# Patient Record
Sex: FEMALE | Race: WHITE | Employment: OTHER | URBAN - METROPOLITAN AREA
[De-identification: names, ages, dates, MRNs, and addresses within clinical notes are randomized per-mention and may not be internally consistent; named-entity substitution may affect disease eponyms.]

---

## 2018-01-22 ENCOUNTER — HOSPITAL ENCOUNTER (OUTPATIENT)
Dept: WOMENS IMAGING | Age: 63
Discharge: HOME | End: 2018-01-22
Attending: OBSTETRICS & GYNECOLOGY | Admitting: OBSTETRICS & GYNECOLOGY

## 2018-01-22 DIAGNOSIS — Z12.31 SCREENING MAMMOGRAM, ENCOUNTER FOR: ICD-10-CM

## 2019-08-23 ENCOUNTER — HOSPITAL ENCOUNTER (OUTPATIENT)
Dept: WOMENS IMAGING | Age: 64
Discharge: HOME | End: 2019-08-23
Payer: COMMERCIAL

## 2019-08-23 DIAGNOSIS — Z12.31 SCREENING MAMMOGRAM, ENCOUNTER FOR: ICD-10-CM

## 2019-08-23 PROCEDURE — 77067 SCR MAMMO BI INCL CAD: CPT

## 2020-08-31 ENCOUNTER — HOSPITAL ENCOUNTER (OUTPATIENT)
Dept: WOMENS IMAGING | Age: 65
Discharge: HOME | End: 2020-08-31
Payer: COMMERCIAL

## 2020-08-31 PROCEDURE — 77080 DXA BONE DENSITY AXIAL: CPT

## 2020-08-31 PROCEDURE — 77067 SCR MAMMO BI INCL CAD: CPT

## 2020-08-31 PROCEDURE — 77063 BREAST TOMOSYNTHESIS BI: CPT

## 2021-09-07 ENCOUNTER — HOSPITAL ENCOUNTER (OUTPATIENT)
Dept: WOMENS IMAGING | Age: 66
Discharge: HOME | End: 2021-09-07
Payer: COMMERCIAL

## 2021-09-07 DIAGNOSIS — Z12.31 ENCOUNTER FOR SCREENING MAMMOGRAM FOR MALIGNANT NEOPLASM OF BREAST: ICD-10-CM

## 2021-09-07 PROCEDURE — 77063 BREAST TOMOSYNTHESIS BI: CPT

## 2022-09-07 ENCOUNTER — HOSPITAL ENCOUNTER (OUTPATIENT)
Dept: WOMENS IMAGING | Age: 67
Discharge: HOME | End: 2022-09-07
Payer: COMMERCIAL

## 2022-09-07 DIAGNOSIS — Z78.0 POSTMENOPAUSAL STATUS: ICD-10-CM

## 2022-09-07 DIAGNOSIS — Z12.31 ENCOUNTER FOR SCREENING MAMMOGRAM FOR MALIGNANT NEOPLASM OF BREAST: ICD-10-CM

## 2022-09-07 PROCEDURE — 77063 BREAST TOMOSYNTHESIS BI: CPT

## 2022-09-07 PROCEDURE — 77080 DXA BONE DENSITY AXIAL: CPT

## 2023-09-13 ENCOUNTER — HOSPITAL ENCOUNTER (OUTPATIENT)
Dept: WOMENS IMAGING | Age: 68
Discharge: HOME | End: 2023-09-13
Payer: COMMERCIAL

## 2023-09-13 VITALS — BODY MASS INDEX: 20.83 KG/M2 | WEIGHT: 125 LBS | HEIGHT: 65 IN

## 2023-09-13 DIAGNOSIS — Z12.31 ENCOUNTER FOR SCREENING MAMMOGRAM FOR MALIGNANT NEOPLASM OF BREAST: ICD-10-CM

## 2023-09-13 PROCEDURE — 77063 BREAST TOMOSYNTHESIS BI: CPT

## 2024-03-22 ENCOUNTER — HOSPITAL ENCOUNTER (INPATIENT)
Age: 69
LOS: 1 days | Discharge: HOME | DRG: 446 | End: 2024-03-23
Attending: STUDENT IN AN ORGANIZED HEALTH CARE EDUCATION/TRAINING PROGRAM | Admitting: STUDENT IN AN ORGANIZED HEALTH CARE EDUCATION/TRAINING PROGRAM
Payer: COMMERCIAL

## 2024-03-22 PROBLEM — R10.9 ABDOMINAL PAIN: Status: ACTIVE | Noted: 2024-03-22

## 2024-03-22 PROCEDURE — 6370000000 HC RX 637 (ALT 250 FOR IP): Performed by: STUDENT IN AN ORGANIZED HEALTH CARE EDUCATION/TRAINING PROGRAM

## 2024-03-22 PROCEDURE — G0379 DIRECT REFER HOSPITAL OBSERV: HCPCS

## 2024-03-22 PROCEDURE — 2580000003 HC RX 258: Performed by: STUDENT IN AN ORGANIZED HEALTH CARE EDUCATION/TRAINING PROGRAM

## 2024-03-22 PROCEDURE — 1200000000 HC SEMI PRIVATE

## 2024-03-22 PROCEDURE — G0378 HOSPITAL OBSERVATION PER HR: HCPCS

## 2024-03-22 RX ORDER — LANOLIN ALCOHOL/MO/W.PET/CERES
3 CREAM (GRAM) TOPICAL NIGHTLY PRN
Status: DISCONTINUED | OUTPATIENT
Start: 2024-03-22 | End: 2024-03-23 | Stop reason: HOSPADM

## 2024-03-22 RX ORDER — MAGNESIUM SULFATE IN WATER 40 MG/ML
2000 INJECTION, SOLUTION INTRAVENOUS PRN
Status: DISCONTINUED | OUTPATIENT
Start: 2024-03-22 | End: 2024-03-23 | Stop reason: HOSPADM

## 2024-03-22 RX ORDER — ACETAMINOPHEN 325 MG/1
650 TABLET ORAL EVERY 6 HOURS PRN
Status: DISCONTINUED | OUTPATIENT
Start: 2024-03-22 | End: 2024-03-23 | Stop reason: HOSPADM

## 2024-03-22 RX ORDER — POTASSIUM CHLORIDE 7.45 MG/ML
10 INJECTION INTRAVENOUS PRN
Status: DISCONTINUED | OUTPATIENT
Start: 2024-03-22 | End: 2024-03-23 | Stop reason: HOSPADM

## 2024-03-22 RX ORDER — SODIUM CHLORIDE 0.9 % (FLUSH) 0.9 %
5-40 SYRINGE (ML) INJECTION PRN
Status: DISCONTINUED | OUTPATIENT
Start: 2024-03-22 | End: 2024-03-23 | Stop reason: HOSPADM

## 2024-03-22 RX ORDER — POTASSIUM CHLORIDE 20 MEQ/1
20 TABLET, EXTENDED RELEASE ORAL ONCE
Status: COMPLETED | OUTPATIENT
Start: 2024-03-22 | End: 2024-03-22

## 2024-03-22 RX ORDER — POLYETHYLENE GLYCOL 3350 17 G/17G
17 POWDER, FOR SOLUTION ORAL DAILY PRN
Status: DISCONTINUED | OUTPATIENT
Start: 2024-03-22 | End: 2024-03-23 | Stop reason: HOSPADM

## 2024-03-22 RX ORDER — SODIUM CHLORIDE 0.9 % (FLUSH) 0.9 %
5-40 SYRINGE (ML) INJECTION EVERY 12 HOURS SCHEDULED
Status: DISCONTINUED | OUTPATIENT
Start: 2024-03-22 | End: 2024-03-23 | Stop reason: HOSPADM

## 2024-03-22 RX ORDER — ONDANSETRON 4 MG/1
4 TABLET, ORALLY DISINTEGRATING ORAL EVERY 8 HOURS PRN
Status: DISCONTINUED | OUTPATIENT
Start: 2024-03-22 | End: 2024-03-23 | Stop reason: HOSPADM

## 2024-03-22 RX ORDER — ONDANSETRON 2 MG/ML
4 INJECTION INTRAMUSCULAR; INTRAVENOUS EVERY 6 HOURS PRN
Status: DISCONTINUED | OUTPATIENT
Start: 2024-03-22 | End: 2024-03-23 | Stop reason: HOSPADM

## 2024-03-22 RX ORDER — SODIUM CHLORIDE 9 MG/ML
INJECTION, SOLUTION INTRAVENOUS PRN
Status: DISCONTINUED | OUTPATIENT
Start: 2024-03-22 | End: 2024-03-23 | Stop reason: HOSPADM

## 2024-03-22 RX ORDER — ENOXAPARIN SODIUM 100 MG/ML
40 INJECTION SUBCUTANEOUS EVERY EVENING
Status: DISCONTINUED | OUTPATIENT
Start: 2024-03-23 | End: 2024-03-23 | Stop reason: HOSPADM

## 2024-03-22 RX ORDER — SODIUM CHLORIDE, SODIUM LACTATE, POTASSIUM CHLORIDE, CALCIUM CHLORIDE 600; 310; 30; 20 MG/100ML; MG/100ML; MG/100ML; MG/100ML
INJECTION, SOLUTION INTRAVENOUS CONTINUOUS
Status: ACTIVE | OUTPATIENT
Start: 2024-03-22 | End: 2024-03-23

## 2024-03-22 RX ORDER — MORPHINE SULFATE 2 MG/ML
2 INJECTION, SOLUTION INTRAMUSCULAR; INTRAVENOUS EVERY 4 HOURS PRN
Status: DISCONTINUED | OUTPATIENT
Start: 2024-03-22 | End: 2024-03-23 | Stop reason: HOSPADM

## 2024-03-22 RX ORDER — ACETAMINOPHEN 650 MG/1
650 SUPPOSITORY RECTAL EVERY 6 HOURS PRN
Status: DISCONTINUED | OUTPATIENT
Start: 2024-03-22 | End: 2024-03-23 | Stop reason: HOSPADM

## 2024-03-22 RX ADMIN — SODIUM CHLORIDE, POTASSIUM CHLORIDE, SODIUM LACTATE AND CALCIUM CHLORIDE: 600; 310; 30; 20 INJECTION, SOLUTION INTRAVENOUS at 23:38

## 2024-03-22 RX ADMIN — POTASSIUM CHLORIDE 20 MEQ: 1500 TABLET, EXTENDED RELEASE ORAL at 23:15

## 2024-03-22 RX ADMIN — SODIUM CHLORIDE, PRESERVATIVE FREE 10 ML: 5 INJECTION INTRAVENOUS at 23:38

## 2024-03-22 ASSESSMENT — PAIN SCALES - GENERAL
PAINLEVEL_OUTOF10: 1
PAINLEVEL_OUTOF10: 1

## 2024-03-23 ENCOUNTER — APPOINTMENT (OUTPATIENT)
Dept: MRI IMAGING | Age: 69
DRG: 446 | End: 2024-03-23
Attending: STUDENT IN AN ORGANIZED HEALTH CARE EDUCATION/TRAINING PROGRAM
Payer: COMMERCIAL

## 2024-03-23 VITALS
RESPIRATION RATE: 16 BRPM | DIASTOLIC BLOOD PRESSURE: 73 MMHG | TEMPERATURE: 97.7 F | HEART RATE: 66 BPM | WEIGHT: 116.84 LBS | SYSTOLIC BLOOD PRESSURE: 125 MMHG | BODY MASS INDEX: 19.47 KG/M2 | OXYGEN SATURATION: 98 % | HEIGHT: 65 IN

## 2024-03-23 LAB
ALBUMIN SERPL-MCNC: 3.9 GM/DL (ref 3.4–5)
ALP BLD-CCNC: 108 IU/L (ref 40–128)
ALT SERPL-CCNC: 8 U/L (ref 10–40)
ANION GAP SERPL CALCULATED.3IONS-SCNC: 9 MMOL/L (ref 7–16)
AST SERPL-CCNC: 15 IU/L (ref 15–37)
BASOPHILS ABSOLUTE: 0.1 K/CU MM
BASOPHILS RELATIVE PERCENT: 1.3 % (ref 0–1)
BILIRUB SERPL-MCNC: 0.7 MG/DL (ref 0–1)
BUN SERPL-MCNC: 10 MG/DL (ref 6–23)
CALCIUM SERPL-MCNC: 9.4 MG/DL (ref 8.3–10.6)
CHLORIDE BLD-SCNC: 102 MMOL/L (ref 99–110)
CO2: 28 MMOL/L (ref 21–32)
CREAT SERPL-MCNC: 0.6 MG/DL (ref 0.6–1.1)
DIFFERENTIAL TYPE: ABNORMAL
EOSINOPHILS ABSOLUTE: 0.3 K/CU MM
EOSINOPHILS RELATIVE PERCENT: 4.2 % (ref 0–3)
ESTIMATED AVERAGE GLUCOSE: 111 MG/DL
GFR SERPL CREATININE-BSD FRML MDRD: >60 ML/MIN/1.73M2
GLUCOSE SERPL-MCNC: 92 MG/DL (ref 70–99)
HBA1C MFR BLD: 5.5 % (ref 4.2–6.3)
HCT VFR BLD CALC: 36.1 % (ref 37–47)
HEMOGLOBIN: 12.1 GM/DL (ref 12.5–16)
IMMATURE NEUTROPHIL %: 0.1 % (ref 0–0.43)
INR BLD: 1 INDEX
LYMPHOCYTES ABSOLUTE: 2.1 K/CU MM
LYMPHOCYTES RELATIVE PERCENT: 28 % (ref 24–44)
MCH RBC QN AUTO: 29.7 PG (ref 27–31)
MCHC RBC AUTO-ENTMCNC: 33.5 % (ref 32–36)
MCV RBC AUTO: 88.7 FL (ref 78–100)
MONOCYTES ABSOLUTE: 0.7 K/CU MM
MONOCYTES RELATIVE PERCENT: 9.6 % (ref 0–4)
NUCLEATED RBC %: 0 %
PDW BLD-RTO: 13.2 % (ref 11.7–14.9)
PLATELET # BLD: 211 K/CU MM (ref 140–440)
PMV BLD AUTO: 9.7 FL (ref 7.5–11.1)
POTASSIUM SERPL-SCNC: 4 MMOL/L (ref 3.5–5.1)
PROTHROMBIN TIME: 13.7 SECONDS (ref 11.7–14.5)
RBC # BLD: 4.07 M/CU MM (ref 4.2–5.4)
SEGMENTED NEUTROPHILS ABSOLUTE COUNT: 4.3 K/CU MM
SEGMENTED NEUTROPHILS RELATIVE PERCENT: 56.8 % (ref 36–66)
SODIUM BLD-SCNC: 139 MMOL/L (ref 135–145)
TOTAL IMMATURE NEUTOROPHIL: 0.01 K/CU MM
TOTAL NUCLEATED RBC: 0 K/CU MM
TOTAL PROTEIN: 6 GM/DL (ref 6.4–8.2)
WBC # BLD: 7.6 K/CU MM (ref 4–10.5)

## 2024-03-23 PROCEDURE — 85025 COMPLETE CBC W/AUTO DIFF WBC: CPT

## 2024-03-23 PROCEDURE — 36415 COLL VENOUS BLD VENIPUNCTURE: CPT

## 2024-03-23 PROCEDURE — 94761 N-INVAS EAR/PLS OXIMETRY MLT: CPT

## 2024-03-23 PROCEDURE — 6370000000 HC RX 637 (ALT 250 FOR IP): Performed by: STUDENT IN AN ORGANIZED HEALTH CARE EDUCATION/TRAINING PROGRAM

## 2024-03-23 PROCEDURE — 99222 1ST HOSP IP/OBS MODERATE 55: CPT | Performed by: SURGERY

## 2024-03-23 PROCEDURE — 85610 PROTHROMBIN TIME: CPT

## 2024-03-23 PROCEDURE — 80053 COMPREHEN METABOLIC PANEL: CPT

## 2024-03-23 PROCEDURE — G0378 HOSPITAL OBSERVATION PER HR: HCPCS

## 2024-03-23 PROCEDURE — 83036 HEMOGLOBIN GLYCOSYLATED A1C: CPT

## 2024-03-23 RX ADMIN — ACETAMINOPHEN 650 MG: 325 TABLET ORAL at 06:46

## 2024-03-23 ASSESSMENT — PAIN - FUNCTIONAL ASSESSMENT: PAIN_FUNCTIONAL_ASSESSMENT: ACTIVITIES ARE NOT PREVENTED

## 2024-03-23 ASSESSMENT — PAIN DESCRIPTION - DESCRIPTORS: DESCRIPTORS: ACHING

## 2024-03-23 ASSESSMENT — PAIN DESCRIPTION - LOCATION: LOCATION: HEAD

## 2024-03-23 NOTE — DISCHARGE INSTRUCTIONS
Patient Discharge Instructions  Dr. Corby Roman  [address] [place] [address]    [address] [facility], [address]  [place] [address]    [phone]         SPECIAL INSTRUCTIONS:       If follow up appointment was not given to you, call the Surgical Clinic at [phone] for follow up appointment with Dr. Roman in:  1 month.          Return if symptoms worsen

## 2024-03-23 NOTE — CONSULTS
Department of General Surgery   Surgical Service Dr. Roman   Consult Note    Date of Consult: 3/23/24    Reason for Consult:  RUQ pain, nausea  Requesting Physician:  Dr. Horowitz    CHIEF COMPLAINT:  RUQ pain    History Obtained From:  patient    HISTORY OF PRESENT ILLNESS:    The patient is a 69 y.o. female who presented with RUQ pain associated with nausea. Symptoms started yesterday and she was seen at [facility].  US showed distended gallbladder with borderline CBD dilation at 7.7mm.  She her nausea and pain have improved. She reports mild headache from not drinking coffee.  Currently she denies other complaints.  Workup is significant for normal LFTs, no leukocytosis. No stones were seen on US.      Past Medical History:    No past medical history on file.    Past Surgical History:    Past Surgical History:   Procedure Laterality Date    NECK SURGERY      reports nodules removed       Current Medications:   Current Facility-Administered Medications   Medication Dose Route Frequency Provider Last Rate Last Admin    sodium chloride flush 0.9 % injection 5-40 mL  5-40 mL IntraVENous 2 times per day Dino Prado MD   10 mL at 03/22/24 2338    sodium chloride flush 0.9 % injection 5-40 mL  5-40 mL IntraVENous PRN Dino Prado MD        0.9 % sodium chloride infusion   IntraVENous PRN Dino Prado MD        potassium chloride 10 mEq/100 mL IVPB (Peripheral Line)  10 mEq IntraVENous PRN Dino Prado MD        magnesium sulfate 2000 mg in 50 mL IVPB premix  2,000 mg IntraVENous PRN Dino Prado MD        enoxaparin (LOVENOX) injection 40 mg  40 mg SubCUTAneous QPM Dino Prado MD        ondansetron (ZOFRAN-ODT) disintegrating tablet 4 mg  4 mg Oral Q8H PRN Dino Prado MD        Or    ondansetron (ZOFRAN) injection 4 mg  4 mg IntraVENous Q6H PRN Dino Prado MD        melatonin tablet 3 mg  3 mg Oral Nightly PRN Dino Prado MD        polyethylene glycol   53 kg (116 lb 13.5 oz)    Height:  1.651 m (5' 5\")        Physical Exam  General: awake, alert, in no acute distress  HEENT: mucous membranes moist  Respiratory: normal effort, no wheezes appreciated  CV: appears well perfused, regular rate and rhythm  Abdomen: Soft, non-tender, non-distended. No guarding or rebound tenderness.  Skin: warm and dry  Extremities: atraumatic  Neuro: no focal deficits noted  Psych: mood normal        DATA:    Lab Results   Component Value Date    WBC 7.6 03/23/2024    HGB 12.1 (L) 03/23/2024    HCT 36.1 (L) 03/23/2024    MCV 88.7 03/23/2024     03/23/2024     Lab Results   Component Value Date/Time     03/23/2024 02:39 AM    K 4.0 03/23/2024 02:39 AM     03/23/2024 02:39 AM    CO2 28 03/23/2024 02:39 AM    BUN 10 03/23/2024 02:39 AM    CREATININE 0.6 03/23/2024 02:39 AM    GLUCOSE 92 03/23/2024 02:39 AM    CALCIUM 9.4 03/23/2024 02:39 AM      [facility]:3/22/24  IMPRESSION:   1. Dilation of the common bile duct to 7.7 mm. This can be seen in common bile   duct stone or stricture. Recommend correlation with laboratory evaluation.   Further evaluation is necessary, recommend MRCP the abdomen without contrast.   2. Moderate gallbladder distention. No gallstones or ultrasound evidence for   acute cholecystitis.   3. Multiple cysts throughout the liver.     IMPRESSION:    69 y.o. female with RUQ pain and nausea. Symptoms have improved.  Symptoms are classical for gallbladder etiology. With normal LFTs and only borderline CBD dilation, I do not feel that MRCP is completely necessary.  I offered cholecystectomy vs observation given that her symptoms have improved.  She prefers observation at this time.    Patient Active Problem List:     Abdominal pain        PLAN:  - ok with discharge from a surgical standpoint  - will provide patient with my office information, she should follow up if symptoms recur        Electronically signed by Corby Roman MD on

## 2024-03-23 NOTE — PLAN OF CARE
Problem: Discharge Planning  Goal: Discharge to home or other facility with appropriate resources  3/22/2024 2302 by Shay Santos RN  Outcome: Progressing  Flowsheets (Taken 3/22/2024 2300)  Discharge to home or other facility with appropriate resources: Refer to discharge planning if patient needs post-hospital services based on physician order or complex needs related to functional status, cognitive ability or social support system  3/22/2024 2241 by Shay Santos RN  Outcome: Progressing  3/22/2024 2240 by Shay Santos RN  Outcome: Progressing     Problem: ABCDS Injury Assessment  Goal: Absence of physical injury  3/22/2024 2302 by Shay Santos RN  Outcome: Progressing  3/22/2024 2241 by Shay Santos RN  Outcome: Progressing  3/22/2024 2240 by Shay Santos RN  Outcome: Progressing     Problem: Pain  Goal: Verbalizes/displays adequate comfort level or baseline comfort level  Outcome: Progressing

## 2024-03-23 NOTE — H&P
History and Physical      Name:  Alan Singh /Age/Sex: 1955  (69 y.o. female)   MRN & CSN:  [id number] & [id number] Encounter Date/Time: 3/22/2024 9:25 PM   Location:  1105/1105-A PCP: Julissa Mitchell,        Hospital Day: 1    Assessment and Plan:     Patient is a 69-year-old female who presented with abdominal pain. Transferred from [facility] ED.     # Dilated common bile duct  - Endorsed upper abdominal pain with nausea x1 day. No fevers or previous episodes. No previous surgeries. Last BM am 3/22. No alcohol intake.   - At OSH ED, RUQ US showed CBD dilatation of 7.7 mm without stones or strictures. Labs including lipase unremarkable. Elias's negative.   - MRCP ordered, follow-up. NPO for now pending further work-up. Supportive care.    # Hyperglycemia  - Mild.  - Follow-up A1c.    Checklist:  Advanced care planning: full  Diet: NPO for now  DVT ppx: Lovenox    Disposition: admit to inpatient.  Estimated discharge: 2-3 day(s).  Current living situation: home.  Expected disposition: home.    Spoke with ED provider who recommended admission for the patient and I agree with that plan.  Personally reviewed lab studies and imaging.  Imaging that was interpreted personally and results as stated above.    History of Present Illness:     Chief Complaint: abdominal pain    Patient is a 69-year-old female with no known PMHx who presented to the ED with upper abdominal pain with nausea x1 day. No fevers or previous episodes. No previous surgeries. Last BM am 3/22. No alcohol intake. Denied any CP, SOB, cough, emesis or urinary changes. Denied any tobacco or alcohol use.    History obtained from: patient and ED provider.    ROS:     Pertinent positives and negatives discussed in HPI above.    Objective:     No intake or output data in the 24 hours ending 24     Vitals: There were no vitals filed for this visit.  BMI: There is no height or weight on file to calculate BMI.  General:  \"BLOODU\", \"GLUCOSEU\", \"KETUA\", \"AMORPHOUS\"  Urine Cultures: No results found for: \"LABURIN\"  Blood Cultures: No results found for: \"BC\"  No results found for: \"BLOODCULT2\"  Organism: No results found for: \"ORG\"    Radiology results:  No orders to display       Dino Prado MD  03/22/24 9:25 PM

## 2024-03-23 NOTE — DISCHARGE SUMMARY
Discharge Summary    Name:  Alan Singh /Age/Sex: 1955  (69 y.o. female)   MRN & CSN:  [id number] & [id number] Admission Date/Time: 3/22/2024  9:21 PM   Attending:  Chucky Horowitz MD Discharging Physician: CARY Pope - [facility] Course:   Alan Signh is a 69 y.o.  female  who presents with Abdominal pain      HPI Per H&P on admission 3/22/2024 \"Patient is a 69-year-old female with no known PMHx who presented to the ED with upper abdominal pain with nausea x1 day. No fevers or previous episodes. No previous surgeries. Last BM am 3/22. No alcohol intake. Denied any CP, SOB, cough, emesis or urinary changes. Denied any tobacco or alcohol use. \"    The patient was placed in observation status, POC as noted below    Abdominal pain, dilated common bile duct-patient reports epigastric pain after eating with nausea vomiting yesterday . US the abdomen showed dilation of the common bile duct to 7.7 mm.  Recommending MRCP without contrast, moderate gallbladder distention without evidence for acute cholecystitis.  LFTs unremarkable.  This can be seen in common bile duct stone or stricture.  She was intravenously hydrated pain and nausea managed.  MRCP had been ordered overnight General surgery consulted and offered patient cholecystectomy versus observation given that her symptoms had improved.  The patient preferred observation. Per general surgery recs did not feel MRCP was completely necessary; therefore was canceled.  Okay to discharge from surgical standpoint with outpatient follow-up.  She had no further nausea vomiting or abdominal pain since admission.  Patient tolerating oral diet without nausea vomiting or return of abdominal pain.      Liver cyst-multiple liver cyst noted on ultrasound.  Outpatient follow-up  Hyperglycemia-A1c 5.5  Normocytic anemia-no signs of active bleeding, History of colonoscopy  status post polypectomy.  Outpatient follow-up    The patient expressed  multiple cysts identified throughout the liver, the largest of which measures 3.2 x 2.2 x 2.0 cm with a thin septation. There are other septated cyst as well. No intrahepatic bile duct dilatation. The common bile duct measures 7.7 mm. Moderate distention of the gallbladder. No stones, wall thickening or pericholecystic fluid. No sonographic Elias sign. Right kidney measures 10.6 x 3.9 x 4.9 cm. Cortical thickness 1.1 cm. Echogenicity of the right kidney appears normal without dilation of the right collecting system.    IMPRESSION: 1. Dilation of the common bile duct to 7.7 mm. This can be seen in common bile duct stone or stricture. Recommend correlation with laboratory evaluation. Further evaluation is necessary, recommend MRCP the abdomen without contrast. 2. Moderate gallbladder distention. No gallstones or ultrasound evidence for acute cholecystitis. 3. Multiple cysts throughout the liver. Report provided using department protocol at the time of dictation. Electronically Signed By: Marcello Kapoor MD on 3/22/2024 4:29 PM    .re  Discharge 29 minutes    Electronically signed by CARY Pope CNP on 3/23/2024 at 9:38 AM

## 2024-03-23 NOTE — PROGRESS NOTES
V2.0  [facility] Progress Note      Name:  Alan Singh /Age/Sex: 1955  (69 y.o. female)   MRN & CSN:  [id number] & [id number] Encounter Date/Time: 3/23/2024 8:58 AM EDT    [facility]  [facility] PCP: Julissa Mitchell DO       Hospital Day: 2    Assessment and Plan:   Alan Singh is a 69 y.o. female  who presents with Abdominal pain    Abdominal pain, dilated common bile duct-patient reports epigastric pain after eating with nausea vomiting yesterday . US the abdomen showed dilation of the common bile duct to 7.7 mm.  Recommending MRCP without contrast, moderate gallbladder distention without evidence for acute cholecystitis.  LFTs unremarkable.  This can be seen in common bile duct stone or stricture.   -N.p.o. except for ice, IV hydration, pain and antiemetic medications as needed, MRCP ordered and pending, will consult General surgery  Liver cyst-multiple liver cyst noted on ultrasound.  Outpatient follow-up  Hyperglycemia-A1c 5.5  Normocytic anemia-no signs of active bleeding, will check iron studies.  History of colonoscopy  status post polypectomy.    Diet Diet NPO Exceptions are: Ice Chips, Sips of Water with Meds, Sips of Clear Liquids   DVT Prophylaxis [x] Lovenox, []  Heparin, [] SCDs, [] Ambulation,  [] Eliquis, [] Xarelto  [] Coumadin   Code Status Full Code   Disposition From: code  Expected Disposition: home  Estimated Date of Discharge: home  Patient requires continued hospitalization due to further evaluation of abdominal pain and pending MRCP   Surrogate Decision Maker/ POA self     Subjective:     Chief Complaint: No chief complaint on file.       Alan Singh is a 69 y.o. female who presents with abdominal pain         Review of Systems:    Review of Systems    No acute events.  Patient denies chest pain or shortness of breath.  She denies fever or chills.  States her abdominal pain has resolved since receiving pain medicine.  Denies nausea at this  time    Objective:   No intake or output data in the 24 hours ending 03/23/24 0858     Vitals:   Vitals:    03/23/24 0300   BP: 99/63   Pulse: 56   Resp: 16   Temp: 98.3 °F (36.8 °C)   SpO2: 94%       Physical Exam:   General: NAD  Eyes: EOMI  ENT: neck supple  Cardiovascular: Regular rate.  Respiratory: Clear to auscultation  Gastrointestinal: Soft, non tender  Genitourinary: no suprapubic tenderness  Musculoskeletal: No edema  Skin: warm, dry  Neuro: Alert.  Psych: Mood appropriate.     Medications:   Medications:    sodium chloride flush  5-40 mL IntraVENous 2 times per day    enoxaparin  40 mg SubCUTAneous QPM      Infusions:    sodium chloride       PRN Meds: sodium chloride flush, 5-40 mL, PRN  sodium chloride, , PRN  potassium chloride, 10 mEq, PRN  magnesium sulfate, 2,000 mg, PRN  ondansetron, 4 mg, Q8H PRN   Or  ondansetron, 4 mg, Q6H PRN  melatonin, 3 mg, Nightly PRN  polyethylene glycol, 17 g, Daily PRN  acetaminophen, 650 mg, Q6H PRN   Or  acetaminophen, 650 mg, Q6H PRN  morphine, 2 mg, Q4H PRN        Labs      Recent Results (from the past 24 hour(s))   Comprehensive Metabolic Panel w/ Reflex to MG    Collection Time: 03/23/24  2:39 AM   Result Value Ref Range    Sodium 139 135 - 145 MMOL/L    Potassium 4.0 3.5 - 5.1 MMOL/L    Chloride 102 99 - 110 mMol/L    CO2 28 21 - 32 MMOL/L    Anion Gap 9 7 - 16    Glucose 92 70 - 99 MG/DL    BUN 10 6 - 23 MG/DL    Creatinine 0.6 0.6 - 1.1 MG/DL    Est, Glom Filt Rate >60 >60 mL/min/1.73m2    Calcium 9.4 8.3 - 10.6 MG/DL    Total Protein 6.0 (L) 6.4 - 8.2 GM/DL    Albumin 3.9 3.4 - 5.0 GM/DL    Total Bilirubin 0.7 0.0 - 1.0 MG/DL    Alkaline Phosphatase 108 40 - 128 IU/L    ALT 8 (L) 10 - 40 U/L    AST 15 15 - 37 IU/L   CBC with Auto Differential    Collection Time: 03/23/24  2:39 AM   Result Value Ref Range    WBC 7.6 4.0 - 10.5 K/CU MM    RBC 4.07 (L) 4.2 - 5.4 M/CU MM    Hemoglobin 12.1 (L) 12.5 - 16.0 GM/DL    Hematocrit 36.1 (L) 37 - 47 %    MCV 88.7 78 -

## 2024-03-23 NOTE — PROGRESS NOTES
4 Eyes Skin Assessment     NAME:  Alan Singh  YOB: 1955  MEDICAL RECORD NUMBER:  [id number]    The patient is being assessed for  Admission    I agree that at least one RN has performed a thorough Head to Toe Skin Assessment on the patient. ALL assessment sites listed below have been assessed.      Areas assessed by both nurses:    Head, Face, Ears, Shoulders, Back, Chest, Arms, Elbows, Hands, Sacrum. Buttock, Coccyx, Ischium, Legs. Feet and Heels, and Under Medical Devices         Does the Patient have a Wound? No noted wound(s)       Yuniel Prevention initiated by RN: Yes  Wound Care Orders initiated by RN: No    Pressure Injury (Stage 3,4, Unstageable, DTI, NWPT, and Complex wounds) if present, place Wound referral order by RN under : No    New Ostomies, if present place, Ostomy referral order under : No     Nurse 1 eSignature: Electronically signed by Shay Santos RN on 3/22/24 at 10:19 PM EDT    **SHARE this note so that the co-signing nurse can place an eSignature**    Nurse 2 eSignature: Electronically signed by Idania Ramos LPN on 3/22/24 at 11:26 PM EDT

## 2024-03-26 ENCOUNTER — HOSPITAL ENCOUNTER (EMERGENCY)
Age: 69
Discharge: HOME | End: 2024-03-26
Payer: COMMERCIAL

## 2024-03-26 ENCOUNTER — APPOINTMENT (OUTPATIENT)
Dept: ULTRASOUND IMAGING | Age: 69
End: 2024-03-26
Payer: COMMERCIAL

## 2024-03-26 VITALS
HEART RATE: 73 BPM | DIASTOLIC BLOOD PRESSURE: 82 MMHG | TEMPERATURE: 98.3 F | SYSTOLIC BLOOD PRESSURE: 135 MMHG | RESPIRATION RATE: 16 BRPM | OXYGEN SATURATION: 97 %

## 2024-03-26 DIAGNOSIS — R10.11 ABDOMINAL PAIN, RIGHT UPPER QUADRANT: Primary | ICD-10-CM

## 2024-03-26 DIAGNOSIS — K76.89 HEPATIC CYST: ICD-10-CM

## 2024-03-26 LAB
ALBUMIN SERPL-MCNC: 4.1 GM/DL (ref 3.4–5)
ALP BLD-CCNC: 122 IU/L (ref 40–129)
ALT SERPL-CCNC: <5 U/L (ref 10–40)
ANION GAP SERPL CALCULATED.3IONS-SCNC: 9 MMOL/L (ref 7–16)
AST SERPL-CCNC: 14 IU/L (ref 15–37)
BACTERIA: ABNORMAL /HPF
BASOPHILS ABSOLUTE: 0.1 K/CU MM
BASOPHILS RELATIVE PERCENT: 1.2 % (ref 0–1)
BILIRUB SERPL-MCNC: 0.9 MG/DL (ref 0–1)
BILIRUBIN URINE: NEGATIVE MG/DL
BLOOD, URINE: NEGATIVE
BUN SERPL-MCNC: 11 MG/DL (ref 6–23)
CALCIUM SERPL-MCNC: 10 MG/DL (ref 8.3–10.6)
CHLORIDE BLD-SCNC: 100 MMOL/L (ref 99–110)
CLARITY: CLEAR
CO2: 31 MMOL/L (ref 21–32)
COLOR: YELLOW
CREAT SERPL-MCNC: 0.6 MG/DL (ref 0.6–1.1)
DIFFERENTIAL TYPE: ABNORMAL
EOSINOPHILS ABSOLUTE: 0.3 K/CU MM
EOSINOPHILS RELATIVE PERCENT: 4.1 % (ref 0–3)
GFR SERPL CREATININE-BSD FRML MDRD: >90 ML/MIN/1.73M2
GLUCOSE SERPL-MCNC: 94 MG/DL (ref 70–99)
GLUCOSE, URINE: NEGATIVE MG/DL
HCT VFR BLD CALC: 42.8 % (ref 37–47)
HEMOGLOBIN: 13.5 GM/DL (ref 12.5–16)
IMMATURE NEUTROPHIL %: 0.3 % (ref 0–0.43)
KETONES, URINE: NEGATIVE MG/DL
LEUKOCYTE ESTERASE, URINE: ABNORMAL
LIPASE: 33 IU/L (ref 13–60)
LYMPHOCYTES ABSOLUTE: 1.6 K/CU MM
LYMPHOCYTES RELATIVE PERCENT: 22.7 % (ref 24–44)
MCH RBC QN AUTO: 28.7 PG (ref 27–31)
MCHC RBC AUTO-ENTMCNC: 31.5 % (ref 32–36)
MCV RBC AUTO: 90.9 FL (ref 78–100)
MONOCYTES ABSOLUTE: 0.9 K/CU MM
MONOCYTES RELATIVE PERCENT: 11.9 % (ref 0–4)
MUCUS: ABNORMAL HPF
NITRITE URINE, QUANTITATIVE: POSITIVE
NUCLEATED RBC %: 0 %
PDW BLD-RTO: 13.2 % (ref 11.7–14.9)
PH, URINE: 7 (ref 5–8)
PLATELET # BLD: 250 K/CU MM (ref 140–440)
PMV BLD AUTO: 9.8 FL (ref 7.5–11.1)
POTASSIUM SERPL-SCNC: 4.4 MMOL/L (ref 3.5–5.1)
PROTEIN UA: NEGATIVE MG/DL
RBC # BLD: 4.71 M/CU MM (ref 4.2–5.4)
RBC URINE: 2 /HPF (ref 0–6)
SEGMENTED NEUTROPHILS ABSOLUTE COUNT: 4.3 K/CU MM
SEGMENTED NEUTROPHILS RELATIVE PERCENT: 59.8 % (ref 36–66)
SODIUM BLD-SCNC: 140 MMOL/L (ref 135–145)
SPECIFIC GRAVITY UA: 1.01 (ref 1–1.03)
SQUAMOUS EPITHELIAL: 3 /HPF
TOTAL IMMATURE NEUTOROPHIL: 0.02 K/CU MM
TOTAL NUCLEATED RBC: 0 K/CU MM
TOTAL PROTEIN: 7.4 GM/DL (ref 6.4–8.2)
TRICHOMONAS: ABNORMAL /HPF
UROBILINOGEN, URINE: 0.2 MG/DL (ref 0.2–1)
WBC # BLD: 7.2 K/CU MM (ref 4–10.5)
WBC UA: 8 /HPF (ref 0–5)

## 2024-03-26 PROCEDURE — 83690 ASSAY OF LIPASE: CPT

## 2024-03-26 PROCEDURE — 81001 URINALYSIS AUTO W/SCOPE: CPT

## 2024-03-26 PROCEDURE — 85025 COMPLETE CBC W/AUTO DIFF WBC: CPT

## 2024-03-26 PROCEDURE — 87186 SC STD MICRODIL/AGAR DIL: CPT

## 2024-03-26 PROCEDURE — 87077 CULTURE AEROBIC IDENTIFY: CPT

## 2024-03-26 PROCEDURE — 99284 EMERGENCY DEPT VISIT MOD MDM: CPT

## 2024-03-26 PROCEDURE — 6370000000 HC RX 637 (ALT 250 FOR IP): Performed by: PHYSICIAN ASSISTANT

## 2024-03-26 PROCEDURE — 76705 ECHO EXAM OF ABDOMEN: CPT

## 2024-03-26 PROCEDURE — 80053 COMPREHEN METABOLIC PANEL: CPT

## 2024-03-26 PROCEDURE — 87086 URINE CULTURE/COLONY COUNT: CPT

## 2024-03-26 RX ORDER — ONDANSETRON 4 MG/1
4 TABLET, ORALLY DISINTEGRATING ORAL 3 TIMES DAILY PRN
Qty: 21 TABLET | Refills: 0 | Status: SHIPPED | OUTPATIENT
Start: 2024-03-26

## 2024-03-26 RX ORDER — SULFAMETHOXAZOLE AND TRIMETHOPRIM 800; 160 MG/1; MG/1
1 TABLET ORAL ONCE
Status: COMPLETED | OUTPATIENT
Start: 2024-03-26 | End: 2024-03-26

## 2024-03-26 RX ORDER — SULFAMETHOXAZOLE AND TRIMETHOPRIM 800; 160 MG/1; MG/1
1 TABLET ORAL 2 TIMES DAILY
Qty: 20 TABLET | Refills: 0 | Status: SHIPPED | OUTPATIENT
Start: 2024-03-26 | End: 2024-04-05

## 2024-03-26 RX ADMIN — SULFAMETHOXAZOLE AND TRIMETHOPRIM 1 TABLET: 800; 160 TABLET ORAL at 13:41

## 2024-03-26 NOTE — ED PROVIDER NOTES
31.5 (*)     Lymphocytes % 22.7 (*)     Monocytes % 11.9 (*)     Eosinophils % 4.1 (*)     Basophils % 1.2 (*)     All other components within normal limits   COMPREHENSIVE METABOLIC PANEL - Abnormal; Notable for the following components:    ALT <5 (*)     AST 14 (*)     All other components within normal limits   URINE MICROSCOPIC WITH REFLEX TO CULTURE - Abnormal; Notable for the following components:    WBC, UA 8 (*)     Bacteria, UA FEW (*)     Mucus, UA RARE (*)     All other components within normal limits   CULTURE, URINE   LIPASE       When ordered only abnormal lab results are displayed. All other labs were within normal range or not returned as of this dictation.    EKG: When ordered, EKG's are interpreted by the Emergency Department Physician in the absence of a cardiologist.  Please see their note for interpretation of EKG.    IMAGING:   Non-plain film images such as CT, Ultrasound and MRI are read by the radiologist. Plain radiographic images are visualized and preliminarily interpreted by the ED Provider with findings mentioned in ED Course and MDM    Interpretation per the Radiologist below, if available at the time of this note:    US ABDOMEN LIMITED Specify organ? GALLBLADDER   Final Result      Common bile duct is only mildly dilated for age, approximately 8 mm. No evidence   for choledocholithiasis, cholelithiasis or intrahepatic biliary duct dilatation.           US ABDOMEN LIMITED Specify organ? GALLBLADDER    Result Date: 3/26/2024  EXAM: ULTRASOUND RIGHT UPPER QUADRANT INDICATION: Right upper quadrant pain, possible dilated common bile duct on the outpatient ultrasound performed last week COMPARISON: 3/22/2024 FINDINGS: PANCREAS: Visualized in the head and neck; normal in visualized portions. LIVER: Anechoic cysts are present in the right hepatic lobe, largest measuring 3.2 x 2.5 x 2.6 cm. GALLBLADDER: The gallbladder wall thickness is normal. No evidence for gallbladder stone, polyp or

## 2024-03-26 NOTE — DISCHARGE INSTRUCTIONS
Contact Dr. Romna's office this week to discuss your repeat visit to the emergency department, as well as to schedule a sooner follow-up.  Please let them know that we discussed your workup with Dr. Roman as well as Dr. Marx who advises that you have a sooner appointment, as well as   possible HIDA scan, and/or MRCP.    Additionally follow-up with the gastroenterologist to discuss the results of your ultrasounds (liver cyst, mild common bile duct dilation)    Follow-up with your primary care provider for reevaluation of your symptoms and to discuss the results of your urine culture.  Meanwhile continue antibiotics until they are completed or advised by  That you can discontinue.    Return with any uncontrollable pain uncontrolled vomiting, fever, jaundice, confusion, worsening symptoms or any new concerns    Meanwhile to help with any abdominal pain or gallbladder issues, you can decrease the high-fat foods in your diet and eat smaller more frequent meals.  If needed use the prescription medications prescribed to you today.

## 2024-03-28 LAB
CULTURE: ABNORMAL
CULTURE: ABNORMAL
Lab: ABNORMAL
SPECIMEN: ABNORMAL

## 2024-03-29 NOTE — PROGRESS NOTES
Pharmacy Note  ED Culture Follow-up    Alan Singh is a 69 y.o. female.     Allergies: Clindamycin/lincomycin     Current antimicrobials:   Reviewed patient's urine culture - culture positive for morganella morganii >100,000 CFU/ml.  Patient was discharged on Bactrim -160 mg by mouth twice daily for 10 days, and culture is sensitive to prescribed medication.  Antibiotic prescribed at discharge is appropriate - no changes made to antibiotic regimen.      Please call with any questions. Ext. [phone]    Libia Gibbs RPH, PharmD 10:11 AM 3/29/2024

## 2024-04-03 ENCOUNTER — OFFICE VISIT (OUTPATIENT)
Dept: SURGERY | Age: 69
End: 2024-04-03
Payer: COMMERCIAL

## 2024-04-03 VITALS
WEIGHT: 120.6 LBS | SYSTOLIC BLOOD PRESSURE: 110 MMHG | HEIGHT: 65 IN | DIASTOLIC BLOOD PRESSURE: 70 MMHG | BODY MASS INDEX: 20.09 KG/M2 | OXYGEN SATURATION: 94 % | HEART RATE: 69 BPM

## 2024-04-03 DIAGNOSIS — K82.8 BILIARY DYSKINESIA: ICD-10-CM

## 2024-04-03 PROCEDURE — 1090F PRES/ABSN URINE INCON ASSESS: CPT | Performed by: SURGERY

## 2024-04-03 PROCEDURE — G8399 PT W/DXA RESULTS DOCUMENT: HCPCS | Performed by: SURGERY

## 2024-04-03 PROCEDURE — 1123F ACP DISCUSS/DSCN MKR DOCD: CPT | Performed by: SURGERY

## 2024-04-03 PROCEDURE — G8420 CALC BMI NORM PARAMETERS: HCPCS | Performed by: SURGERY

## 2024-04-03 PROCEDURE — G8427 DOCREV CUR MEDS BY ELIG CLIN: HCPCS | Performed by: SURGERY

## 2024-04-03 PROCEDURE — 99214 OFFICE O/P EST MOD 30 MIN: CPT | Performed by: SURGERY

## 2024-04-03 PROCEDURE — 4004F PT TOBACCO SCREEN RCVD TLK: CPT | Performed by: SURGERY

## 2024-04-03 PROCEDURE — 1111F DSCHRG MED/CURRENT MED MERGE: CPT | Performed by: SURGERY

## 2024-04-03 PROCEDURE — 3017F COLORECTAL CA SCREEN DOC REV: CPT | Performed by: SURGERY

## 2024-04-03 RX ORDER — SODIUM CHLORIDE 9 MG/ML
INJECTION, SOLUTION INTRAVENOUS PRN
OUTPATIENT
Start: 2024-04-03

## 2024-04-03 RX ORDER — SODIUM CHLORIDE 0.9 % (FLUSH) 0.9 %
5-40 SYRINGE (ML) INJECTION EVERY 12 HOURS SCHEDULED
OUTPATIENT
Start: 2024-04-03

## 2024-04-03 RX ORDER — SODIUM CHLORIDE 0.9 % (FLUSH) 0.9 %
5-40 SYRINGE (ML) INJECTION PRN
OUTPATIENT
Start: 2024-04-03

## 2024-04-03 RX ORDER — SODIUM CHLORIDE, SODIUM LACTATE, POTASSIUM CHLORIDE, CALCIUM CHLORIDE 600; 310; 30; 20 MG/100ML; MG/100ML; MG/100ML; MG/100ML
INJECTION, SOLUTION INTRAVENOUS CONTINUOUS
OUTPATIENT
Start: 2024-04-03

## 2024-04-03 NOTE — PROGRESS NOTES
Chief Complaint   Patient presents with    Other     NP, was seen at [facility] ER RUQ pain/ nausea. US in chart          SUBJECTIVE:  HPI: Patient is here with complaints of right upper quadrant pain radiating to her back.  She was initially seen by me in the hospital after being transferred from an outside facility with ultrasound showing marginally dilated common bile duct.  LFTs were normal.  Her main complaint is pain.  She has not had a lot of nausea or vomiting.  Does not have food triggers.  She returned to the ER last week where ultrasound was again performed and common bile duct remain upper limits of normal.  Lab work was basically normal.  Currently symptoms have improved.    I have reviewed the patient's(pertinent information to this visit) medical history, family history(scanned in  the [employer] under \"patient questioner\"), social history and review of systems with the patient today in the office.            Past Surgical History:   Procedure Laterality Date    NECK SURGERY      reports nodules removed     No past medical history on file.  Family History   Problem Relation Age of Onset    Breast Cancer Sister 74    Breast Cancer Sister 73    Ovarian Cancer Neg Hx      Social History     Socioeconomic History    Marital status:      Spouse name: Not on file    Number of children: Not on file    Years of education: Not on file    Highest education level: Not on file   Occupational History    Not on file   Tobacco Use    Smoking status: Not on file    Smokeless tobacco: Not on file   Substance and Sexual Activity    Alcohol use: Not on file    Drug use: Not on file    Sexual activity: Not on file   Other Topics Concern    Not on file   Social History Narrative    Not on file     Social Determinants of Health     Financial Resource Strain: Not on file   Food Insecurity: No Food Insecurity (3/22/2024)    Hunger Vital Sign     Worried About Running Out of Food in the Last Year: Never true     Ran Out of

## 2024-04-22 ENCOUNTER — TELEPHONE (OUTPATIENT)
Dept: SURGERY | Age: 69
End: 2024-04-22

## 2024-04-22 NOTE — TELEPHONE ENCOUNTER
SPOKE TO  Alan Singh REGARDING SURGERY (LAP ANNIE) SCHEDULED @ [facility]. NOTIFIED OF DATES, TIMES AND LOCATION    PHONE ASSESSMENT   SURGERY - 4/29/24 @ 12 ARRIVAL @ 10  P/O - 5/15/24 @ 2 PM    NPO AFTER MIDNIGHT    HOLD BLOOD THINNERS - NA

## 2024-10-01 ENCOUNTER — HOSPITAL ENCOUNTER (OUTPATIENT)
Dept: WOMENS IMAGING | Age: 69
Discharge: HOME | End: 2024-10-01
Payer: COMMERCIAL

## 2024-10-01 DIAGNOSIS — Z12.31 VISIT FOR SCREENING MAMMOGRAM: ICD-10-CM

## 2024-10-01 DIAGNOSIS — Z78.0 POSTMENOPAUSAL STATE: ICD-10-CM

## 2024-10-01 PROCEDURE — 77080 DXA BONE DENSITY AXIAL: CPT

## 2024-10-01 PROCEDURE — 77063 BREAST TOMOSYNTHESIS BI: CPT

## 2025-01-16 ENCOUNTER — TELEPHONE (OUTPATIENT)
Dept: SURGERY | Age: 70
End: 2025-01-16